# Patient Record
Sex: MALE | Race: ASIAN | NOT HISPANIC OR LATINO | URBAN - METROPOLITAN AREA
[De-identification: names, ages, dates, MRNs, and addresses within clinical notes are randomized per-mention and may not be internally consistent; named-entity substitution may affect disease eponyms.]

---

## 2020-08-31 ENCOUNTER — OFFICE (OUTPATIENT)
Dept: URBAN - METROPOLITAN AREA CLINIC 34 | Facility: CLINIC | Age: 35
End: 2020-08-31
Payer: MEDICAID

## 2020-08-31 VITALS
TEMPERATURE: 97.6 F | DIASTOLIC BLOOD PRESSURE: 85 MMHG | HEIGHT: 63 IN | SYSTOLIC BLOOD PRESSURE: 135 MMHG | HEART RATE: 81 BPM | WEIGHT: 158 LBS

## 2020-08-31 DIAGNOSIS — R10.13 EPIGASTRIC PAIN: ICD-10-CM

## 2020-08-31 DIAGNOSIS — R19.4 CHANGE IN BOWEL HABIT: ICD-10-CM

## 2020-08-31 DIAGNOSIS — R12 HEARTBURN: ICD-10-CM

## 2020-08-31 DIAGNOSIS — K92.1 MELENA: ICD-10-CM

## 2020-08-31 PROCEDURE — 99204 OFFICE O/P NEW MOD 45 MIN: CPT

## 2020-08-31 NOTE — SERVICEHPINOTES
EVAN PETERS   is a   34   year old male who is being seen in consultation at the request of    for blood in stool.Anival states approx. 2 weeks ago was having blood clots in stool and back pain, fatigue.  Was given pantoprazole which he has been taking 40mg BID. He went to urgent care and hgb there was wnl. Rectal exam revealed external hemorrhoids and neg fobt. Currently, not having any bleeding. BMs more normal now. BRBMs vary, can go every other day or 3-4x/day. was having more diarrhea when blood was occurring. Anival also reports issues with heartburn and epigastric pain for past 3-4 years. 2 years ago states he had EGD in [facility] in which gastritis was found, believes he was given an antibiotic. Symptoms have since returned. He was previously having nausea, but not anymore. No vomiting or dysphagia. Did lose 3-4 lbs when sx began but has since gained weight back. within the past 1-2 weeks. No NSAIDs. No known family hx of IBD or GI cancer. No prior colonoscopy.

## 2020-09-17 ENCOUNTER — OFFICE (OUTPATIENT)
Dept: URBAN - METROPOLITAN AREA CLINIC 34 | Facility: CLINIC | Age: 35
End: 2020-09-17
Payer: MEDICAID

## 2020-09-17 DIAGNOSIS — Z11.59 ENCOUNTER FOR SCREENING FOR OTHER VIRAL DISEASES: ICD-10-CM

## 2020-09-17 PROCEDURE — 99211 OFF/OP EST MAY X REQ PHY/QHP: CPT | Mod: 25,CS | Performed by: INTERNAL MEDICINE

## 2020-09-21 ENCOUNTER — AMBULATORY SURGICAL CENTER (OUTPATIENT)
Dept: URBAN - METROPOLITAN AREA SURGERY 23 | Facility: SURGERY | Age: 35
End: 2020-09-21
Payer: MEDICAID

## 2020-09-21 DIAGNOSIS — R12 HEARTBURN: ICD-10-CM

## 2020-09-21 DIAGNOSIS — R10.84 GENERALIZED ABDOMINAL PAIN: ICD-10-CM

## 2020-09-21 DIAGNOSIS — K62.5 HEMORRHAGE OF ANUS AND RECTUM: ICD-10-CM

## 2020-09-21 PROCEDURE — 43235 EGD DIAGNOSTIC BRUSH WASH: CPT | Performed by: INTERNAL MEDICINE

## 2020-09-21 PROCEDURE — 45378 DIAGNOSTIC COLONOSCOPY: CPT | Performed by: INTERNAL MEDICINE
